# Patient Record
Sex: FEMALE | Race: WHITE | NOT HISPANIC OR LATINO | Employment: OTHER | URBAN - METROPOLITAN AREA
[De-identification: names, ages, dates, MRNs, and addresses within clinical notes are randomized per-mention and may not be internally consistent; named-entity substitution may affect disease eponyms.]

---

## 2018-10-31 ENCOUNTER — OFFICE VISIT (OUTPATIENT)
Dept: CARDIOLOGY | Facility: HOSPITAL | Age: 68
End: 2018-10-31

## 2018-10-31 ENCOUNTER — HOSPITAL ENCOUNTER (OUTPATIENT)
Dept: CARDIOLOGY | Facility: HOSPITAL | Age: 68
Discharge: HOME | End: 2018-10-31
Admitting: NURSE PRACTITIONER

## 2018-10-31 VITALS
SYSTOLIC BLOOD PRESSURE: 126 MMHG | TEMPERATURE: 98.2 F | HEIGHT: 65 IN | OXYGEN SATURATION: 94 % | DIASTOLIC BLOOD PRESSURE: 61 MMHG | HEART RATE: 142 BPM | WEIGHT: 213 LBS | BODY MASS INDEX: 35.49 KG/M2 | RESPIRATION RATE: 20 BRPM

## 2018-10-31 DIAGNOSIS — I10 ESSENTIAL HYPERTENSION: ICD-10-CM

## 2018-10-31 DIAGNOSIS — I48.19 PERSISTENT ATRIAL FIBRILLATION (HCC): Primary | ICD-10-CM

## 2018-10-31 DIAGNOSIS — R06.09 DYSPNEA ON EXERTION: ICD-10-CM

## 2018-10-31 DIAGNOSIS — I48.19 PERSISTENT ATRIAL FIBRILLATION (HCC): ICD-10-CM

## 2018-10-31 DIAGNOSIS — I50.9 HEART FAILURE, UNSPECIFIED HF CHRONICITY, UNSPECIFIED HEART FAILURE TYPE (HCC): ICD-10-CM

## 2018-10-31 PROCEDURE — 93005 ELECTROCARDIOGRAM TRACING: CPT | Performed by: NURSE PRACTITIONER

## 2018-10-31 PROCEDURE — 93010 ELECTROCARDIOGRAM REPORT: CPT | Performed by: INTERNAL MEDICINE

## 2018-10-31 PROCEDURE — 99204 OFFICE O/P NEW MOD 45 MIN: CPT | Performed by: NURSE PRACTITIONER

## 2018-10-31 RX ORDER — LEVOTHYROXINE SODIUM 0.05 MG/1
50 TABLET ORAL DAILY
COMMUNITY
End: 2018-11-27

## 2018-10-31 RX ORDER — DILTIAZEM HYDROCHLORIDE 120 MG/1
120 CAPSULE, COATED, EXTENDED RELEASE ORAL DAILY
Qty: 30 CAPSULE | Refills: 3 | Status: SHIPPED | OUTPATIENT
Start: 2018-10-31 | End: 2018-10-31 | Stop reason: SDUPTHER

## 2018-10-31 RX ORDER — OMEGA-3S/DHA/EPA/FISH OIL/D3 300MG-1000
400 CAPSULE ORAL DAILY
COMMUNITY
End: 2018-11-27

## 2018-10-31 RX ORDER — CITALOPRAM 20 MG/1
TABLET ORAL DAILY
COMMUNITY

## 2018-10-31 RX ORDER — LANOLIN ALCOHOL/MO/W.PET/CERES
1000 CREAM (GRAM) TOPICAL DAILY
COMMUNITY
End: 2018-11-27

## 2018-10-31 RX ORDER — ANASTROZOLE 1 MG/1
1 TABLET ORAL DAILY
COMMUNITY

## 2018-10-31 RX ORDER — PHENOL 1.4 %
600 AEROSOL, SPRAY (ML) MUCOUS MEMBRANE DAILY
COMMUNITY

## 2018-10-31 RX ORDER — DILTIAZEM HYDROCHLORIDE 120 MG/1
120 CAPSULE, EXTENDED RELEASE ORAL DAILY
Qty: 30 CAPSULE | Refills: 0 | Status: ON HOLD | OUTPATIENT
Start: 2018-10-31 | End: 2018-12-03

## 2018-10-31 RX ORDER — METOPROLOL TARTRATE 50 MG/1
50 TABLET, FILM COATED ORAL 2 TIMES DAILY
COMMUNITY
End: 2019-01-29 | Stop reason: SDUPTHER

## 2018-10-31 RX ORDER — MULTIPLE VITAMINS W/ MINERALS TAB 9MG-400MCG
1 TAB ORAL DAILY
COMMUNITY

## 2018-11-01 PROBLEM — I48.91 A-FIB (HCC): Status: ACTIVE | Noted: 2018-11-01

## 2018-11-01 PROBLEM — I50.9 HEART FAILURE (HCC): Status: ACTIVE | Noted: 2018-11-01

## 2018-11-01 PROBLEM — I10 HYPERTENSION: Status: ACTIVE | Noted: 2018-11-01

## 2018-11-02 ENCOUNTER — TELEPHONE (OUTPATIENT)
Dept: CARDIOLOGY | Facility: HOSPITAL | Age: 68
End: 2018-11-02

## 2018-11-02 ENCOUNTER — DOCUMENTATION (OUTPATIENT)
Dept: CARDIOLOGY | Facility: HOSPITAL | Age: 68
End: 2018-11-02

## 2018-11-02 DIAGNOSIS — E78.01 FAMILIAL HYPERCHOLESTEROLEMIA: Primary | ICD-10-CM

## 2018-11-02 DIAGNOSIS — I48.19 PERSISTENT ATRIAL FIBRILLATION (HCC): ICD-10-CM

## 2018-11-02 DIAGNOSIS — G47.33 OSA (OBSTRUCTIVE SLEEP APNEA): Primary | ICD-10-CM

## 2018-11-02 NOTE — PROGRESS NOTES
See medical records and reviewed echocardiogram completed 5/14/18 with normal EF    Sleep study completed January 2017 with moderate obstructive sleep apnea.  Patient is not wearing CPAP.  We'll discuss repeat sleep study or referral to [facility] sleep [place] for management of sleep apnea    Reviewed recent lab results    10/3/18: TSH 1.89    8/28/18:   WBC 7.7, hemoglobin 12.1, hematocrit 37.5, platelet 226    Sodium 146, potassium 4.2, chloride 102, carbon dioxide 32, glucose 124, BUN 12, creatinine 0.9, AST 16, ALT 9, estimated GFR 66    04/12/18:  Cholesterol 235, triglycerides 159, HDL 51,

## 2018-11-02 NOTE — TELEPHONE ENCOUNTER
----- Message from AMOR Russo sent at 11/2/2018 11:03 AM EDT -----  I have received pts echo from [facility].    Sleep study results and labs    Call pt let her know that sleep study showed moderate RELL.  With Afib she will need management of RELL.  I would like to put in a referral to our sleep center to see if a repeat sleep study is needed or if they could being treatment.  Would pt be ok with this?    Also I received her labs showing elevated cholesterol levels in 4/2018.  ON day of her stress test have her get fasting labs to recheck levels.  If still elevated. She will need medication for treatment.    How is her Blood pressure and HR after starting cardizem?

## 2018-11-02 NOTE — TELEPHONE ENCOUNTER
----- Message from AMOR Russo sent at 11/2/2018 11:03 AM EDT -----  I have received pts echo from [facility].    Sleep study results and labs    Call pt let her know that sleep study showed moderate RELL.  With Afib she will need management of RELL.  I would like to put in a referral to our sleep center to see if a repeat sleep study is needed or if they could being treatment.  Would pt be ok with this?    Also I received her labs showing elevated cholesterol levels in 4/2018.  ON day of her stress test have her get fasting labs to recheck levels.  If still elevated. She will need medication for treatment.    How is her Blood pressure and HR after starting cardizem?

## 2018-11-02 NOTE — TELEPHONE ENCOUNTER
Called pt and gave the above message. Pt is ok with the referral to the sleep center. Pt going to get fasting labs on the day of her stress test. Pt reported that her HR is the same after starting the Cardizem and reported she did not get her BP machine yet. Told her to call our office with BP reading when she gets one.

## 2018-11-05 ENCOUNTER — TELEPHONE (OUTPATIENT)
Dept: CARDIOLOGY | Facility: HOSPITAL | Age: 68
End: 2018-11-05

## 2018-11-05 NOTE — TELEPHONE ENCOUNTER
Called pt and told her not to stop metoprolol or the cardizem before her stress test. Pt verbalized understanding.

## 2018-11-05 NOTE — TELEPHONE ENCOUNTER
----- Message from AMOR Russo sent at 11/5/2018 12:54 PM EST -----  Regarding: RE: Bp readings  Contact: [phone]  I do not want her to stop either med before her stress test because of the afib.   ----- Message -----  From: Mark Knutson MA  Sent: 11/5/2018  12:32 PM  To: AMOR Russo  Subject: Bp readings                                      Pt called and reported the following BP and HR readings.  11/4/18 @ 9.30 am  134/89   81  11/4/18 @ 6.00 pm  135/76   90  11/5/18 @ 6.00 am  133/78   77    Pt also asked if she has to be off of both metoprolol and cardizem 24 hours prior to her stress test.

## 2018-11-16 ENCOUNTER — HOSPITAL ENCOUNTER (OUTPATIENT)
Dept: CARDIOLOGY | Facility: HOSPITAL | Age: 68
Discharge: HOME | End: 2018-11-16

## 2018-11-16 VITALS
HEIGHT: 65 IN | WEIGHT: 212.96 LBS | DIASTOLIC BLOOD PRESSURE: 80 MMHG | SYSTOLIC BLOOD PRESSURE: 120 MMHG | BODY MASS INDEX: 35.48 KG/M2

## 2018-11-16 DIAGNOSIS — R06.09 DYSPNEA ON EXERTION: ICD-10-CM

## 2018-11-16 DIAGNOSIS — I48.19 PERSISTENT ATRIAL FIBRILLATION (HCC): ICD-10-CM

## 2018-11-16 LAB
BH CV STRESS BP STAGE 2: NORMAL
BH CV STRESS BP STAGE 4: NORMAL
BH CV STRESS COMMENTS STAGE 1: NORMAL
BH CV STRESS DOSE REGADENOSON STAGE 1: 0.4
BH CV STRESS DURATION MIN STAGE 1: 1
BH CV STRESS DURATION MIN STAGE 2: 1
BH CV STRESS DURATION MIN STAGE 3: 1
BH CV STRESS DURATION MIN STAGE 4: 1
BH CV STRESS DURATION SEC STAGE 1: 0
BH CV STRESS DURATION SEC STAGE 2: 0
BH CV STRESS HR STAGE 1: 86
BH CV STRESS HR STAGE 2: 93
BH CV STRESS HR STAGE 3: 85
BH CV STRESS HR STAGE 4: 95
BH CV STRESS PROTOCOL 1: NORMAL
BH CV STRESS RECOVERY BP: NORMAL MMHG
BH CV STRESS RECOVERY HR: 86 BPM
BH CV STRESS STAGE 1: 1
BH CV STRESS STAGE 2: 2
BH CV STRESS STAGE 3: 3
BH CV STRESS STAGE 4: 4
LV EF NUC BP: 70 %
MAXIMAL PREDICTED HEART RATE: 152 BPM
PERCENT MAX PREDICTED HR: 65.79 %
STRESS BASELINE BP: NORMAL MMHG
STRESS BASELINE HR: 87 BPM
STRESS PERCENT HR: 77 %
STRESS POST PEAK BP: NORMAL MMHG
STRESS POST PEAK HR: 100 BPM
STRESS TARGET HR: 129 BPM

## 2018-11-16 PROCEDURE — 25010000002 REGADENOSON 0.4 MG/5ML SOLUTION: Performed by: NURSE PRACTITIONER

## 2018-11-16 PROCEDURE — 78452 HT MUSCLE IMAGE SPECT MULT: CPT | Performed by: INTERNAL MEDICINE

## 2018-11-16 PROCEDURE — A9500 TC99M SESTAMIBI: HCPCS | Performed by: NURSE PRACTITIONER

## 2018-11-16 PROCEDURE — 0 TECHNETIUM SESTAMIBI: Performed by: NURSE PRACTITIONER

## 2018-11-16 PROCEDURE — 78452 HT MUSCLE IMAGE SPECT MULT: CPT

## 2018-11-16 PROCEDURE — 93017 CV STRESS TEST TRACING ONLY: CPT

## 2018-11-16 PROCEDURE — 93018 CV STRESS TEST I&R ONLY: CPT | Performed by: INTERNAL MEDICINE

## 2018-11-16 RX ADMIN — TECHNETIUM TC 99M SESTAMIBI 1 DOSE: 1 INJECTION INTRAVENOUS at 12:55

## 2018-11-16 RX ADMIN — REGADENOSON 0.4 MG: 0.08 INJECTION, SOLUTION INTRAVENOUS at 12:53

## 2018-11-16 RX ADMIN — TECHNETIUM TC 99M SESTAMIBI 1 DOSE: 1 INJECTION INTRAVENOUS at 11:20

## 2018-11-19 ENCOUNTER — TELEPHONE (OUTPATIENT)
Dept: CARDIOLOGY | Facility: HOSPITAL | Age: 68
End: 2018-11-19

## 2018-11-19 NOTE — TELEPHONE ENCOUNTER
----- Message from AMOR Russo sent at 11/19/2018  7:31 AM EST -----  Stress test reviewed. Normal heart muscle function.  NO evidence of a heart blockage.

## 2018-11-27 ENCOUNTER — PREP FOR SURGERY (OUTPATIENT)
Dept: OTHER | Facility: HOSPITAL | Age: 68
End: 2018-11-27

## 2018-11-27 ENCOUNTER — CONSULT (OUTPATIENT)
Dept: CARDIOLOGY | Facility: CLINIC | Age: 68
End: 2018-11-27

## 2018-11-27 VITALS
SYSTOLIC BLOOD PRESSURE: 128 MMHG | DIASTOLIC BLOOD PRESSURE: 86 MMHG | BODY MASS INDEX: 36.37 KG/M2 | HEIGHT: 64 IN | WEIGHT: 213 LBS | HEART RATE: 89 BPM

## 2018-11-27 DIAGNOSIS — I48.19 PERSISTENT ATRIAL FIBRILLATION (HCC): Primary | ICD-10-CM

## 2018-11-27 PROCEDURE — 93000 ELECTROCARDIOGRAM COMPLETE: CPT | Performed by: PHYSICIAN ASSISTANT

## 2018-11-27 PROCEDURE — 99203 OFFICE O/P NEW LOW 30 MIN: CPT | Performed by: PHYSICIAN ASSISTANT

## 2018-11-27 RX ORDER — SODIUM CHLORIDE 0.9 % (FLUSH) 0.9 %
3 SYRINGE (ML) INJECTION EVERY 12 HOURS SCHEDULED
Status: CANCELLED | OUTPATIENT
Start: 2018-11-27

## 2018-11-27 RX ORDER — PROPAFENONE HYDROCHLORIDE 150 MG/1
150 TABLET, COATED ORAL EVERY 8 HOURS
Qty: 90 TABLET | Refills: 5 | Status: SHIPPED | OUTPATIENT
Start: 2018-11-27 | End: 2019-01-29

## 2018-11-27 RX ORDER — ACETAMINOPHEN 325 MG/1
650 TABLET ORAL EVERY 4 HOURS PRN
Status: CANCELLED | OUTPATIENT
Start: 2018-11-27

## 2018-11-27 RX ORDER — POTASSIUM CHLORIDE 20 MEQ/1
20 TABLET, EXTENDED RELEASE ORAL AS NEEDED
COMMUNITY

## 2018-11-27 RX ORDER — FUROSEMIDE 20 MG/1
20 TABLET ORAL AS NEEDED
COMMUNITY

## 2018-11-27 RX ORDER — ONDANSETRON 2 MG/ML
4 INJECTION INTRAMUSCULAR; INTRAVENOUS EVERY 6 HOURS PRN
Status: CANCELLED | OUTPATIENT
Start: 2018-11-27

## 2018-11-27 RX ORDER — SODIUM CHLORIDE 0.9 % (FLUSH) 0.9 %
3-10 SYRINGE (ML) INJECTION AS NEEDED
Status: CANCELLED | OUTPATIENT
Start: 2018-11-27

## 2018-11-27 NOTE — PROGRESS NOTES
Renetta Reilly  1950  PCP: Tee Salmeron DO    SUBJECTIVE:   Renetta Reilly is a 68 y.o. female seen for a consultation visit regarding the following:     Chief Complaint:   Chief Complaint   Patient presents with   • Atrial Fibrillation   • Irregular Heart Beat   • Shortness of Breath          Consultation is requested by AMOR Russo for evaluation of Atrial Fibrillation; Irregular Heart Beat; and Shortness of Breath        History:  Location***, quality***, timing***, duration***, modifying***      Cardiac PMH: (Old records have been reviewed and summarized below)      Past Medical History, Past Surgical History, Family history, Social History, and Medications were all reviewed with the patient today and updated as necessary.       Current Outpatient Medications:   •  anastrozole (ARIMIDEX) 1 MG tablet, Take 1 mg by mouth Daily., Disp: , Rfl:   •  apixaban (ELIQUIS) 5 MG tablet tablet, Take 5 mg by mouth 2 (Two) Times a Day., Disp: , Rfl:   •  calcium carbonate (OS-KENROY) 600 MG tablet, Take 600 mg by mouth Daily., Disp: , Rfl:   •  citalopram (CeleXA) 20 MG tablet, Take  by mouth Daily., Disp: , Rfl:   •  diltiazem XR (DILACOR XR) 120 MG 24 hr capsule, Take 1 capsule by mouth Daily., Disp: 30 capsule, Rfl: 0  •  Fluticasone Furoate-Vilanterol (BREO ELLIPTA) 100-25 MCG/INH aerosol powder , Inhale 1 puff Daily., Disp: , Rfl:   •  furosemide (LASIX) 20 MG tablet, Take 20 mg by mouth As Needed., Disp: , Rfl:   •  metoprolol tartrate (LOPRESSOR) 50 MG tablet, Take 50 mg by mouth 2 (Two) Times a Day., Disp: , Rfl:   •  Multiple Vitamins-Minerals (MULTIVITAMIN WITH MINERALS) tablet tablet, Take 1 tablet by mouth Daily., Disp: , Rfl:   •  potassium chloride (K-DUR,KLOR-CON) 20 MEQ CR tablet, Take 20 mEq by mouth As Needed., Disp: , Rfl:     Allergies   Allergen Reactions   • Amiodarone Arrhythmia     States got deathly ill. States had congestive heart failure.          Past Medical History:   Diagnosis Date   •  "A-fib (CMS/[facility])    • Anxiety    • Breast CA (CMS/HCC)    • COPD (chronic obstructive pulmonary disease) (CMS/HCC)    • Depressed    • Heart failure (CMS/HCC)    • Heart failure (CMS/HCC)    • Hypertension    • Hypothyroidism    • Obesity    • Thyroid disease      Past Surgical History:   Procedure Laterality Date   • BREAST SURGERY Right    • FOOT SURGERY Right    • HAND SURGERY Left      Family History   Problem Relation Age of Onset   • Stroke Mother    • Stroke Father    • Colon cancer Sister      Social History     Tobacco Use   • Smoking status: Former Smoker     Years: 20.00     Types: Cigarettes     Last attempt to quit: 2000     Years since quittin.9   • Smokeless tobacco: Never Used   Substance Use Topics   • Alcohol use: No       ROS:  ***       PHYSICAL EXAM:   /86 (BP Location: Left arm, Patient Position: Sitting)   Pulse 89   Ht 162.6 cm (64\")   Wt 96.6 kg (213 lb)   BMI 36.56 kg/m²      Wt Readings from Last 5 Encounters:   18 96.6 kg (213 lb)   18 96.6 kg (212 lb 15.4 oz)   10/31/18 96.6 kg (213 lb)     BP Readings from Last 5 Encounters:   18 128/86   18 120/80   10/31/18 126/61       General-Well Nourished, Well developed  Eyes - PERRLA  Neck- supple, No mass  CV- regular rate and rhythm, no MRG  Lung- clear bilaterally  Abd- soft, +BS  Musc/skel - Norm strength and range of motion  Skin- warm and dry  Neuro - Alert & Oriented x 3, appropriate mood.    Medical problems and test results were reviewed with the patient today.     Results for orders placed or performed during the hospital encounter of 18   Stress Test With Myocardial Perfusion (1 Day)   Result Value Ref Range    Target HR (85%) 129 bpm    Max. Pred. HR (100%) 152 bpm    BH CV STRESS PROTOCOL 1 Pharmacologic     Stage 1 1     HR Stage 1 86     Duration Min Stage 1 1     Duration Sec Stage 1 0     Stress Dose Regadenoson Stage 1 0.4     Stress Comments Stage 1 10 sec bolus injection     Stage 2 " 2     HR Stage 2 93     BP Stage 2 130/90     Duration Min Stage 2 1     Duration Sec Stage 2 0     Stage 3 3     HR Stage 3 85     Duration Min Stage 3 1     Stage 4 4     HR Stage 4 95     BP Stage 4 130/80     Duration Min Stage 4 1     Baseline HR 87 bpm    Baseline /80 mmHg    Recovery /80 mmHg    Peak  bpm    Percent Max Pred HR 65.79 %    Percent Target HR 77 %    Peak /80 mmHg    Recovery HR 86 bpm    Nuc Stress EF 70 %         No results found for: CHOL, HDL, HDLC, LDL, LDLC, VLDL    EKG:  (EKG/Tracing has been independently visualized by me and summarized below)    Procedures    ASSESSMENT and PLAN      No Follow-up on file.            Luke Lira M.D., F.A.C.C, F.H.R.S.  Cardiology/Electrophysiology  11/27/18  1:58 PM

## 2018-11-27 NOTE — PROGRESS NOTES
"Renetta Reilly  1950  PCP: Tee Salmeron DO    SUBJECTIVE:   Renetta Reilly is a 68 y.o. female seen for a consultation visit regarding the following:     Chief Complaint:   Chief Complaint   Patient presents with   • Atrial Fibrillation   • Irregular Heart Beat   • Shortness of Breath          Consultation is requested by AMOR Russo for evaluation of Atrial Fibrillation; Irregular Heart Beat; and Shortness of Breath        History:    Patient is a 68 year old female with a history of atrial fibrillation (diagnosed in 2016), COPD, BC s/p mastectomy, and hypothyroidism that presents today in consultation for afib. She has reportedly been on Rythmol and and BB in past and maintained rhythm for 2 years. Her Rythmol was discontinued in June and she believes that is when she went out of rhythm. She is symptomatic with EID and palpitations. She was started on Tikosyn, but was discontinued because \"her heart went crazy and was beating 220 bpm\". She was also intolerant to amiodarone in the past. She feels SOB on a daily basis. Denies any chest pain or LE edema.       Cardiac PMH: (Old records have been reviewed and summarized below)    • A-fib (CMS/[facility]) 11/01/2018       Note Last Updated: 11/1/2018       · DX 2016  · Chadsvsc: 4 (HTN, female, age, HF)  · Echocardiogram 5/18/2018 ([facility]): EF 60-65%, diastolic dysfunction, LA 4.8 cm, mild MR, mild AR, mild TR, RVSP 35 mmHg  · History of Rythmol use with recurrent A. Fib  · Reported Tikosyn initiation, 10 2018, [facility] [place]: Unclear of why discontinued      • Heart failure (CMS/[facility]) 11/01/2018       Note Last Updated: 11/1/2018       · HF with preserved EF, setting of A. Fib with RVR      • Hypertension              Past Medical History, Past Surgical History, Family history, Social History, and Medications were all reviewed with the patient today and updated as necessary.       Current Outpatient Medications:   •  anastrozole (ARIMIDEX) 1 MG tablet, Take 1 mg " by mouth Daily., Disp: , Rfl:   •  apixaban (ELIQUIS) 5 MG tablet tablet, Take 5 mg by mouth 2 (Two) Times a Day., Disp: , Rfl:   •  calcium carbonate (OS-KENROY) 600 MG tablet, Take 600 mg by mouth Daily., Disp: , Rfl:   •  citalopram (CeleXA) 20 MG tablet, Take  by mouth Daily., Disp: , Rfl:   •  diltiazem XR (DILACOR XR) 120 MG 24 hr capsule, Take 1 capsule by mouth Daily., Disp: 30 capsule, Rfl: 0  •  Fluticasone Furoate-Vilanterol (BREO ELLIPTA) 100-25 MCG/INH aerosol powder , Inhale 1 puff Daily., Disp: , Rfl:   •  furosemide (LASIX) 20 MG tablet, Take 20 mg by mouth As Needed., Disp: , Rfl:   •  metoprolol tartrate (LOPRESSOR) 50 MG tablet, Take 50 mg by mouth 2 (Two) Times a Day., Disp: , Rfl:   •  Multiple Vitamins-Minerals (MULTIVITAMIN WITH MINERALS) tablet tablet, Take 1 tablet by mouth Daily., Disp: , Rfl:   •  potassium chloride (K-DUR,KLOR-CON) 20 MEQ CR tablet, Take 20 mEq by mouth As Needed., Disp: , Rfl:     Allergies   Allergen Reactions   • Amiodarone Arrhythmia     States got deathly ill. States had congestive heart failure.          Past Medical History:   Diagnosis Date   • A-fib (CMS/HCC)    • Anxiety    • Breast CA (CMS/HCC)    • COPD (chronic obstructive pulmonary disease) (CMS/HCC)    • Depressed    • Heart failure (CMS/HCC)    • Heart failure (CMS/HCC)    • Hypertension    • Hypothyroidism    • Obesity    • Thyroid disease      Past Surgical History:   Procedure Laterality Date   • BREAST SURGERY Right    • FOOT SURGERY Right    • HAND SURGERY Left      Family History   Problem Relation Age of Onset   • Stroke Mother    • Stroke Father    • Colon cancer Sister      Social History     Tobacco Use   • Smoking status: Former Smoker     Years: 20.00     Types: Cigarettes     Last attempt to quit: 2000     Years since quittin.9   • Smokeless tobacco: Never Used   Substance Use Topics   • Alcohol use: No       ROS:  Review of Systems:  General: no recent weight loss/gain, weakness or  "fatigue  Skin: no rashes, lumps, or other skin changes  HEENT: no dizziness, lightheadedness, or vision changes  Respiratory: no cough or hemoptysis  Cardiovascular: + palpitations, and tachycardia  Gastrointestinal: no black/tarry stools or diarrhea  Urinary: no change in frequency or urgency  Peripheral Vascular: no claudication or leg cramps  Musculoskeletal: no muscle or joint pain/stiffness  Psychiatric: no depression or excessive stress  Neurological: no sensory or motor loss, no syncope  Hematologic: no anemia, easy bruising or bleeding  Endocrine: no thyroid problems, nor heat or cold intolerance       PHYSICAL EXAM:   /86 (BP Location: Left arm, Patient Position: Sitting)   Pulse 89   Ht 162.6 cm (64\")   Wt 96.6 kg (213 lb)   BMI 36.56 kg/m²      Wt Readings from Last 5 Encounters:   11/27/18 96.6 kg (213 lb)   11/16/18 96.6 kg (212 lb 15.4 oz)   10/31/18 96.6 kg (213 lb)     BP Readings from Last 5 Encounters:   11/27/18 128/86   11/16/18 120/80   10/31/18 126/61       General-Well Nourished, Well developed  Eyes - PERRLA  Neck- supple, No mass  CV- irregular rate and rhythm, no MRG  Lung- clear bilaterally  Abd- soft, +BS  Musc/skel - Norm strength and range of motion  Skin- warm and dry  Neuro - Alert & Oriented x 3, appropriate mood.    Medical problems and test results were reviewed with the patient today.     Results for orders placed or performed during the hospital encounter of 11/16/18   Stress Test With Myocardial Perfusion (1 Day)   Result Value Ref Range    Target HR (85%) 129 bpm    Max. Pred. HR (100%) 152 bpm    BH CV STRESS PROTOCOL 1 Pharmacologic     Stage 1 1     HR Stage 1 86     Duration Min Stage 1 1     Duration Sec Stage 1 0     Stress Dose Regadenoson Stage 1 0.4     Stress Comments Stage 1 10 sec bolus injection     Stage 2 2     HR Stage 2 93     BP Stage 2 130/90     Duration Min Stage 2 1     Duration Sec Stage 2 0     Stage 3 3     HR Stage 3 85     Duration Min Stage " 3 1     Stage 4 4     HR Stage 4 95     BP Stage 4 130/80     Duration Min Stage 4 1     Baseline HR 87 bpm    Baseline /80 mmHg    Recovery /80 mmHg    Peak  bpm    Percent Max Pred HR 65.79 %    Percent Target HR 77 %    Peak /80 mmHg    Recovery HR 86 bpm    Nuc Stress EF 70 %         No results found for: CHOL, HDL, HDLC, LDL, LDLC, VLDL    EKG:  (EKG/Tracing has been independently visualized by me and summarized below)      ECG 12 Lead  Date/Time: 11/27/2018 2:28 PM  Performed by: Nancy Reyes PA  Authorized by: Nancy Reyes PA   Rhythm: atrial fibrillation  Rate: normal  BPM: 89  Clinical impression: abnormal ECG and dysrhythmia - atrial            ASSESSMENT and PLAN    1. Persistent Atrial Fibrillation- previously maintained on Rythmol until discontinued. Unable to tolerate Amiodarone. Had reported prolonged QT and ? Ventricular arrhythmias with Tikosyn. Will restart Rythmol today and plan for ECV on Monday. Patient has been anticoagulated with Eliquis 5mg BID.     2. HTN- controlled.     No Follow-up on file.      LOULOU SaenzC  Cardiology/Electrophysiology  11/27/18  1:59 PM

## 2018-12-03 ENCOUNTER — HOSPITAL ENCOUNTER (OUTPATIENT)
Dept: CARDIOLOGY | Facility: HOSPITAL | Age: 68
Discharge: HOME | End: 2018-12-03
Attending: INTERNAL MEDICINE | Admitting: INTERNAL MEDICINE

## 2018-12-03 VITALS
HEART RATE: 64 BPM | HEIGHT: 64 IN | TEMPERATURE: 98 F | DIASTOLIC BLOOD PRESSURE: 76 MMHG | BODY MASS INDEX: 35.98 KG/M2 | OXYGEN SATURATION: 95 % | RESPIRATION RATE: 18 BRPM | SYSTOLIC BLOOD PRESSURE: 132 MMHG | WEIGHT: 210.76 LBS

## 2018-12-03 DIAGNOSIS — I48.19 PERSISTENT ATRIAL FIBRILLATION (HCC): ICD-10-CM

## 2018-12-03 LAB
ALBUMIN SERPL-MCNC: 4.57 G/DL (ref 3.2–4.8)
ALBUMIN/GLOB SERPL: 1.9 G/DL (ref 1.5–2.5)
ALP SERPL-CCNC: 70 U/L (ref 25–100)
ALT SERPL W P-5'-P-CCNC: 33 U/L (ref 7–40)
ANION GAP SERPL CALCULATED.3IONS-SCNC: 6 MMOL/L (ref 3–11)
AST SERPL-CCNC: 49 U/L (ref 0–33)
BILIRUB SERPL-MCNC: 0.5 MG/DL (ref 0.3–1.2)
BUN BLD-MCNC: 22 MG/DL (ref 9–23)
BUN/CREAT SERPL: 23.9 (ref 7–25)
CALCIUM SPEC-SCNC: 9.3 MG/DL (ref 8.7–10.4)
CHLORIDE SERPL-SCNC: 103 MMOL/L (ref 99–109)
CO2 SERPL-SCNC: 28 MMOL/L (ref 20–31)
CREAT BLD-MCNC: 0.92 MG/DL (ref 0.6–1.3)
DEPRECATED RDW RBC AUTO: 43.8 FL (ref 37–54)
ERYTHROCYTE [DISTWIDTH] IN BLOOD BY AUTOMATED COUNT: 13.4 % (ref 11.3–14.5)
GFR SERPL CREATININE-BSD FRML MDRD: 61 ML/MIN/1.73
GLOBULIN UR ELPH-MCNC: 2.4 GM/DL
GLUCOSE BLD-MCNC: 108 MG/DL (ref 70–100)
HCT VFR BLD AUTO: 42.8 % (ref 34.5–44)
HGB BLD-MCNC: 13.2 G/DL (ref 11.5–15.5)
INR PPP: 1.3 (ref 0.91–1.09)
MAGNESIUM SERPL-MCNC: 1.9 MG/DL (ref 1.3–2.7)
MCH RBC QN AUTO: 27.8 PG (ref 27–31)
MCHC RBC AUTO-ENTMCNC: 30.8 G/DL (ref 32–36)
MCV RBC AUTO: 90.1 FL (ref 80–99)
PLATELET # BLD AUTO: 244 10*3/MM3 (ref 150–450)
PMV BLD AUTO: 11.3 FL (ref 6–12)
POTASSIUM BLD-SCNC: 4.2 MMOL/L (ref 3.5–5.5)
PROT SERPL-MCNC: 7 G/DL (ref 5.7–8.2)
PROTHROMBIN TIME: 13.6 SECONDS (ref 9.6–11.5)
RBC # BLD AUTO: 4.75 10*6/MM3 (ref 3.89–5.14)
SODIUM BLD-SCNC: 137 MMOL/L (ref 132–146)
WBC NRBC COR # BLD: 7.42 10*3/MM3 (ref 3.5–10.8)

## 2018-12-03 PROCEDURE — 92960 CARDIOVERSION ELECTRIC EXT: CPT | Performed by: INTERNAL MEDICINE

## 2018-12-03 PROCEDURE — S0260 H&P FOR SURGERY: HCPCS | Performed by: INTERNAL MEDICINE

## 2018-12-03 PROCEDURE — 92960 CARDIOVERSION ELECTRIC EXT: CPT

## 2018-12-03 PROCEDURE — 80053 COMPREHEN METABOLIC PANEL: CPT | Performed by: INTERNAL MEDICINE

## 2018-12-03 PROCEDURE — 93005 ELECTROCARDIOGRAM TRACING: CPT | Performed by: INTERNAL MEDICINE

## 2018-12-03 PROCEDURE — 85610 PROTHROMBIN TIME: CPT | Performed by: INTERNAL MEDICINE

## 2018-12-03 PROCEDURE — 36415 COLL VENOUS BLD VENIPUNCTURE: CPT

## 2018-12-03 PROCEDURE — 93010 ELECTROCARDIOGRAM REPORT: CPT | Performed by: INTERNAL MEDICINE

## 2018-12-03 PROCEDURE — 85027 COMPLETE CBC AUTOMATED: CPT | Performed by: INTERNAL MEDICINE

## 2018-12-03 PROCEDURE — 83735 ASSAY OF MAGNESIUM: CPT | Performed by: INTERNAL MEDICINE

## 2018-12-03 PROCEDURE — 25010000002 MIDAZOLAM PER 1 MG: Performed by: INTERNAL MEDICINE

## 2018-12-03 RX ORDER — NALOXONE HYDROCHLORIDE 0.4 MG/ML
INJECTION, SOLUTION INTRAMUSCULAR; INTRAVENOUS; SUBCUTANEOUS
Status: DISCONTINUED
Start: 2018-12-03 | End: 2018-12-03 | Stop reason: WASHOUT

## 2018-12-03 RX ORDER — SODIUM CHLORIDE 0.9 % (FLUSH) 0.9 %
3 SYRINGE (ML) INJECTION EVERY 12 HOURS SCHEDULED
Status: DISCONTINUED | OUTPATIENT
Start: 2018-12-03 | End: 2018-12-03 | Stop reason: HOSPADM

## 2018-12-03 RX ORDER — MIDAZOLAM HYDROCHLORIDE 1 MG/ML
INJECTION INTRAMUSCULAR; INTRAVENOUS
Status: COMPLETED | OUTPATIENT
Start: 2018-12-03 | End: 2018-12-03

## 2018-12-03 RX ORDER — SODIUM CHLORIDE 0.9 % (FLUSH) 0.9 %
3-10 SYRINGE (ML) INJECTION AS NEEDED
Status: DISCONTINUED | OUTPATIENT
Start: 2018-12-03 | End: 2018-12-03 | Stop reason: HOSPADM

## 2018-12-03 RX ORDER — FLUMAZENIL 0.1 MG/ML
INJECTION INTRAVENOUS
Status: DISCONTINUED
Start: 2018-12-03 | End: 2018-12-03 | Stop reason: WASHOUT

## 2018-12-03 RX ORDER — ACETAMINOPHEN 325 MG/1
650 TABLET ORAL EVERY 4 HOURS PRN
Status: DISCONTINUED | OUTPATIENT
Start: 2018-12-03 | End: 2018-12-03 | Stop reason: HOSPADM

## 2018-12-03 RX ORDER — MIDAZOLAM HYDROCHLORIDE 1 MG/ML
INJECTION INTRAMUSCULAR; INTRAVENOUS
Status: DISCONTINUED
Start: 2018-12-03 | End: 2018-12-03 | Stop reason: HOSPADM

## 2018-12-03 RX ORDER — LEVOTHYROXINE SODIUM 0.05 MG/1
50 TABLET ORAL DAILY
COMMUNITY

## 2018-12-03 RX ORDER — ONDANSETRON 2 MG/ML
4 INJECTION INTRAMUSCULAR; INTRAVENOUS EVERY 6 HOURS PRN
Status: DISCONTINUED | OUTPATIENT
Start: 2018-12-03 | End: 2018-12-03 | Stop reason: HOSPADM

## 2018-12-03 RX ORDER — PANTOPRAZOLE SODIUM 40 MG/1
40 TABLET, DELAYED RELEASE ORAL DAILY
COMMUNITY
End: 2019-01-29

## 2018-12-03 RX ADMIN — METHOHEXITAL SODIUM 20 MG: 500 INJECTION, POWDER, LYOPHILIZED, FOR SOLUTION INTRAMUSCULAR; INTRAVENOUS; RECTAL at 11:54

## 2018-12-03 RX ADMIN — MIDAZOLAM HYDROCHLORIDE 2 MG: 1 INJECTION, SOLUTION INTRAMUSCULAR; INTRAVENOUS at 11:54

## 2018-12-03 NOTE — H&P
"  Chief Complaint: afib       Subjective:     Patient is a 68 y.o. female who presents with atrial fibrillation for ECV. She was seen in the office last week and has not had any changes since that time.     History:     Patient is a 68 year old female with a history of atrial fibrillation (diagnosed in 2016), COPD, BC s/p mastectomy, and hypothyroidism that presents today in consultation for afib. She has reportedly been on Rythmol and and BB in past and maintained rhythm for 2 years. Her Rythmol was discontinued in June and she believes that is when she went out of rhythm. She is symptomatic with EID and palpitations. She was started on Tikosyn, but was discontinued because \"her heart went crazy and was beating 220 bpm\". She was also intolerant to amiodarone in the past. She feels SOB on a daily basis. Denies any chest pain or LE edema.         Cardiac PMH: (Old records have been reviewed and summarized below)           • A-fib (CMS/[facility]) 11/01/2018       Note Last Updated: 11/1/2018       · DX 2016  · Chadsvsc: 4 (HTN, female, age, HF)  · Echocardiogram 5/18/2018 ([facility]): EF 60-65%, diastolic dysfunction, LA 4.8 cm, mild MR, mild AR, mild TR, RVSP 35 mmHg  · History of Rythmol use with recurrent A. Fib  · Reported Tikosyn initiation, 10 2018, [facility] [place]: Unclear of why discontinued      • Heart failure (CMS/[facility]) 11/01/2018       Note Last Updated: 11/1/2018       · HF with preserved EF, setting of A. Fib with RVR      • Hypertension                  Past Medical History:   Diagnosis Date   • A-fib (CMS/HCC)    • Anxiety    • Breast CA (CMS/HCC)    • COPD (chronic obstructive pulmonary disease) (CMS/HCC)    • Depressed    • Heart failure (CMS/HCC)    • Heart failure (CMS/HCC)    • Hypertension    • Hypothyroidism    • Obesity    • Thyroid disease       Past Surgical History:   Procedure Laterality Date   • BREAST SURGERY Right    • FOOT SURGERY Right    • HAND SURGERY Left       Allergies   Allergen Reactions "   • Amiodarone Arrhythmia     States got deathly ill. States had congestive heart failure.      Social History     Tobacco Use   • Smoking status: Former Smoker     Years: 20.00     Types: Cigarettes     Last attempt to quit: 2000     Years since quittin.9   • Smokeless tobacco: Never Used   Substance Use Topics   • Alcohol use: No      FH:   Family History   Problem Relation Age of Onset   • Stroke Mother    • Stroke Father    • Colon cancer Sister           Current Facility-Administered Medications:   •  acetaminophen (TYLENOL) tablet 650 mg, 650 mg, Oral, Q4H PRN, Nancy Reyes PA  •  ondansetron (ZOFRAN) injection 4 mg, 4 mg, Intravenous, Q6H PRN, Nancy Reyes PA  •  sodium chloride 0.9 % flush 3 mL, 3 mL, Intravenous, Q12H, Nancy Reyes PA  •  sodium chloride 0.9 % flush 3-10 mL, 3-10 mL, Intravenous, PRN, Nancy Reyes PA    Review of Systems  Review of Systems:  General: no recent weight loss/gain, weakness or fatigue  Skin: no rashes, lumps, or other skin changes  HEENT: no dizziness, lightheadedness, or vision changes  Respiratory: no cough or hemoptysis  Cardiovascular: + palpitations, and tachycardia  Gastrointestinal: no black/tarry stools or diarrhea  Urinary: no change in frequency or urgency  Peripheral Vascular: no claudication or leg cramps  Musculoskeletal: no muscle or joint pain/stiffness  Psychiatric: no depression or excessive stress  Neurological: no sensory or motor loss, no syncope  Hematologic: no anemia, easy bruising or bleeding  Endocrine: no thyroid problems, nor heat or cold intolerance        Objective:       There were no vitals taken for this visit.    No intake/output data recorded.  No intake/output data recorded.    Physical Exam:  General-Well Nourished, Well developed  Eyes - PERRLA  Neck- supple, No mass  CV- irregular rate and rhythm, no MRG  Lung- clear bilaterally  Abd- soft, +BS  Musc/skel - Norm strength and range of motion  Skin- warm and dry  Neuro -  Alert & Oriented x 3, appropriate mood.      ECG: (I have reviewed the EKG/Tracing from today and listed the findings below) unchanged from previous tracings     Data Review:     No results found for this or any previous visit (from the past 24 hour(s)).        Assessment:     A-fib (CMS/[facility])         Plan:   1. Persistent Atrial Fibrillation   - restarted on Rythmol last week and presents for external cardioversion today.   - Unable to tolerate amiodarone and had reported QT prolongation with Tikosyn.  - long run can consider pulmonary vein ablation if failure of Rythmol.   - Continue Eliquis 5mg BID.     Electronically signed by ENA Reyez, 12/03/18, 10:39 AM.

## 2018-12-03 NOTE — PROCEDURES
PRE-ELECTROPHYSIOLOGY STUDY DIAGNOSIS: Atrial fibrillation.    PROCEDURE PERFORMED: External electrical cardioversion.    Anesthesia: Sedation with:  1. 2 mg Versed  2. 20 mg Brevital    Estimated Blood Loss: Less than 10 mL     Specimens: None    PROCEDURE IN DETAIL: The patient was brought into the [facility] in a fasting  state. The patient was given moderate sedation during which he received 200  joules of external electrical cardioversion that converted atrial  fibrillation to normal sinus heart rhythm.    IMPRESSION: Successful conversion of atrial fibrillation to normal sinus  heart rhythm.

## 2018-12-04 ENCOUNTER — TELEPHONE (OUTPATIENT)
Dept: CARDIOLOGY | Facility: CLINIC | Age: 68
End: 2018-12-04

## 2018-12-04 NOTE — TELEPHONE ENCOUNTER
Patient had an ECV yesterday. She is back out of rhythm today. Her BP is 125/66 and HR is between . Please advise.

## 2018-12-06 ENCOUNTER — TELEPHONE (OUTPATIENT)
Dept: CARDIOLOGY | Facility: CLINIC | Age: 68
End: 2018-12-06

## 2019-01-29 ENCOUNTER — OFFICE VISIT (OUTPATIENT)
Dept: CARDIOLOGY | Facility: CLINIC | Age: 69
End: 2019-01-29

## 2019-01-29 VITALS
SYSTOLIC BLOOD PRESSURE: 160 MMHG | HEART RATE: 90 BPM | DIASTOLIC BLOOD PRESSURE: 90 MMHG | HEIGHT: 65 IN | BODY MASS INDEX: 35.22 KG/M2 | WEIGHT: 211.4 LBS

## 2019-01-29 DIAGNOSIS — I10 ESSENTIAL HYPERTENSION: ICD-10-CM

## 2019-01-29 DIAGNOSIS — I48.21 PERMANENT ATRIAL FIBRILLATION (HCC): Primary | ICD-10-CM

## 2019-01-29 PROCEDURE — 99214 OFFICE O/P EST MOD 30 MIN: CPT | Performed by: PHYSICIAN ASSISTANT

## 2019-01-29 PROCEDURE — 93000 ELECTROCARDIOGRAM COMPLETE: CPT | Performed by: PHYSICIAN ASSISTANT

## 2019-01-29 RX ORDER — METOPROLOL TARTRATE 50 MG/1
50 TABLET, FILM COATED ORAL 2 TIMES DAILY
Qty: 60 TABLET | Refills: 6 | Status: SHIPPED | OUTPATIENT
Start: 2019-01-29 | End: 2019-05-28 | Stop reason: SDUPTHER

## 2019-01-29 RX ORDER — DILTIAZEM HYDROCHLORIDE 120 MG/1
120 CAPSULE, COATED, EXTENDED RELEASE ORAL DAILY
COMMUNITY
End: 2019-01-29 | Stop reason: SDUPTHER

## 2019-01-29 RX ORDER — DILTIAZEM HYDROCHLORIDE 120 MG/1
120 CAPSULE, COATED, EXTENDED RELEASE ORAL DAILY
Qty: 30 CAPSULE | Refills: 6 | Status: SHIPPED | OUTPATIENT
Start: 2019-01-29 | End: 2019-05-28 | Stop reason: SDUPTHER

## 2019-01-29 NOTE — PROGRESS NOTES
"    Renetta Reilly  1950  PCP: Tee Salmeron,     SUBJECTIVE:   Renetta Reilly is a 68 y.o. female seen for a follow up visit regarding the following:     Chief Complaint: Follow up for persistent afib     HPI:    Since last visit the patient's status has been stable. She only stayed in rhythm about 1 day following ECV, but she notes significant improvement in her symptoms. She does note this is the best she has felt in many months. No CP, palps, SOB, fatigue.     History:     Patient is a 68 year old female with a history of atrial fibrillation (diagnosed in 2016), COPD, BC s/p mastectomy, and hypothyroidism that presents today in consultation for afib. She has reportedly been on Rythmol and and BB in past and maintained rhythm for 2 years. Her Rythmol was discontinued in June and she believes that is when she went out of rhythm. She is symptomatic with EID and palpitations. She was started on Tikosyn, but was discontinued because \"her heart went crazy and was beating 220 bpm\". She was also intolerant to amiodarone in the past. She feels SOB on a daily basis. Denies any chest pain or LE edema.         Cardiac PMH: (Old records have been reviewed and summarized below)    • A-fib (CMS/[facility]) 11/01/2018       Note Last Updated: 11/1/2018       · DX 2016  · Chadsvsc: 4 (HTN, female, age, HF)  · Echocardiogram 5/18/2018 ([facility]): EF 60-65%, diastolic dysfunction, LA 4.8 cm, mild MR, mild AR, mild TR, RVSP 35 mmHg  · History of Rythmol use with recurrent A. Fib  · Reported Tikosyn initiation, 10 2018, [facility] [place]: Unclear of why discontinued      • Heart failure (CMS/[facility]) 11/01/2018       Note Last Updated: 11/1/2018       · HF with preserved EF, setting of A. Fib with RVR      • Hypertension                  Current Outpatient Medications:   •  anastrozole (ARIMIDEX) 1 MG tablet, Take 1 mg by mouth Daily., Disp: , Rfl:   •  apixaban (ELIQUIS) 5 MG tablet tablet, Take 5 mg by mouth 2 (Two) Times a Day., Disp: , " Rfl:   •  calcium carbonate (OS-KENROY) 600 MG tablet, Take 600 mg by mouth Daily., Disp: , Rfl:   •  citalopram (CeleXA) 20 MG tablet, Take  by mouth Daily., Disp: , Rfl:   •  diltiaZEM CD (CARDIZEM CD) 120 MG 24 hr capsule, Take 120 mg by mouth Daily., Disp: , Rfl:   •  Fluticasone Furoate-Vilanterol (BREO ELLIPTA) 100-25 MCG/INH aerosol powder , Inhale 1 puff Daily., Disp: , Rfl:   •  furosemide (LASIX) 20 MG tablet, Take 20 mg by mouth As Needed., Disp: , Rfl:   •  levothyroxine (SYNTHROID, LEVOTHROID) 50 MCG tablet, Take 50 mcg by mouth Daily., Disp: , Rfl:   •  metoprolol tartrate (LOPRESSOR) 50 MG tablet, Take 50 mg by mouth 2 (Two) Times a Day., Disp: , Rfl:   •  Multiple Vitamins-Minerals (MULTIVITAMIN WITH MINERALS) tablet tablet, Take 1 tablet by mouth Daily., Disp: , Rfl:   •  potassium chloride (K-DUR,KLOR-CON) 20 MEQ CR tablet, Take 20 mEq by mouth As Needed., Disp: , Rfl:     Past Medical History, Past Surgical History, Family history, Social History, and Medications were all reviewed with the patient today and updated as necessary.       Patient Active Problem List   Diagnosis   • A-fib (CMS/HCC)   • Heart failure (CMS/HCC)   • Hypertension   • RELL (obstructive sleep apnea)     Allergies   Allergen Reactions   • Amiodarone Arrhythmia     States got deathly ill. States had congestive heart failure.      Past Medical History:   Diagnosis Date   • A-fib (CMS/HCC)    • Anxiety    • Breast CA (CMS/HCC)    • COPD (chronic obstructive pulmonary disease) (CMS/HCC)    • Depressed    • Heart failure (CMS/HCC)    • Heart failure (CMS/HCC)    • Hypertension    • Hypothyroidism    • Obesity    • Thyroid disease      Past Surgical History:   Procedure Laterality Date   • BREAST SURGERY Right    • FOOT SURGERY Right    • HAND SURGERY Left      Family History   Problem Relation Age of Onset   • Stroke Mother    • Stroke Father    • Colon cancer Sister      Social History     Tobacco Use   • Smoking status: Former  "Smoker     Years: 20.00     Types: Cigarettes     Last attempt to quit: 2000     Years since quittin.0   • Smokeless tobacco: Never Used   Substance Use Topics   • Alcohol use: No         PHYSICAL EXAM:    /90 (BP Location: Left arm, Patient Position: Sitting)   Pulse 90   Ht 165.1 cm (65\")   Wt 95.9 kg (211 lb 6.4 oz)   BMI 35.18 kg/m²        Wt Readings from Last 5 Encounters:   19 95.9 kg (211 lb 6.4 oz)   18 95.6 kg (210 lb 12.2 oz)   18 96.6 kg (213 lb)   18 96.6 kg (212 lb 15.4 oz)   10/31/18 96.6 kg (213 lb)       BP Readings from Last 5 Encounters:   19 160/90   18 132/76   18 128/86   18 120/80   10/31/18 126/61       General-Well Nourished, Well developed  Eyes - PERRLA  Neck- supple, No mass  CV- irregular rhythm, normal rate. no MRG, No edema  Lung- clear bilaterally  Abd- soft, +BS  Musc/skel - Norm strength and range of motion  Skin- warm and dry  Neuro - Alert & Oriented x 3, appropriate mood.        Medical problems and test results were reviewed with the patient today.     No results found for this or any previous visit (from the past 672 hour(s)).      EKG: (EKG has been independently visualized by me and summarized below)      ECG 12 Lead  Date/Time: 2019 1:49 PM  Performed by: Nancy Reyes PA  Authorized by: Nancy Reyes PA   Rhythm: atrial fibrillation  Rate: normal  BPM: 90  Conduction: conduction normal  ST Segments: ST segments normal  T Waves: T waves normal  QRS axis: right  Other: no other findings  Clinical impression: abnormal ECG             ASSESSMENT and PLAN    1. Persistent, now more permanent AFib- s/p ECV 2018 after starting Rythmol with return to afib within 2 days. Rates previously were uncontrolled. Rythmol discontinued and now rate controlled afib w/ Dilt and Lopressor. Patient reports feeling significantly better despite being in afib. Has failed Tikosyn in the past due to [place] and unable to " tolerate amio. We are very limited with AADs. Will place 24 hour holter to ensure rates are controlled. If patient is symptomatically improved, will likely just pursue rate control. Continue Dilitiazem, Lopressor. Eliquis 5mg BID for anticoagulation.     2. HTN- controlled. Continue current meds     Return in about 3 months (around 4/29/2019).      Nancy Reyes PA-C  Cardiology/Electrophysiology  1/29/2019  1:52 PM

## 2019-05-28 ENCOUNTER — OFFICE VISIT (OUTPATIENT)
Dept: CARDIOLOGY | Facility: CLINIC | Age: 69
End: 2019-05-28

## 2019-05-28 VITALS
SYSTOLIC BLOOD PRESSURE: 140 MMHG | OXYGEN SATURATION: 96 % | DIASTOLIC BLOOD PRESSURE: 86 MMHG | HEIGHT: 64 IN | WEIGHT: 214 LBS | BODY MASS INDEX: 36.54 KG/M2 | HEART RATE: 78 BPM

## 2019-05-28 DIAGNOSIS — I10 ESSENTIAL HYPERTENSION: Primary | ICD-10-CM

## 2019-05-28 DIAGNOSIS — I48.21 PERMANENT ATRIAL FIBRILLATION (HCC): ICD-10-CM

## 2019-05-28 PROCEDURE — 99213 OFFICE O/P EST LOW 20 MIN: CPT | Performed by: PHYSICIAN ASSISTANT

## 2019-05-28 PROCEDURE — 93000 ELECTROCARDIOGRAM COMPLETE: CPT | Performed by: PHYSICIAN ASSISTANT

## 2019-05-28 RX ORDER — DILTIAZEM HYDROCHLORIDE 120 MG/1
120 CAPSULE, COATED, EXTENDED RELEASE ORAL DAILY
Qty: 30 CAPSULE | Refills: 6 | Status: SHIPPED | OUTPATIENT
Start: 2019-05-28

## 2019-05-28 RX ORDER — METOPROLOL TARTRATE 50 MG/1
50 TABLET, FILM COATED ORAL 2 TIMES DAILY
Qty: 60 TABLET | Refills: 6 | Status: SHIPPED | OUTPATIENT
Start: 2019-05-28

## 2019-05-28 NOTE — PROGRESS NOTES
"    Renetta Reilly  1950  PCP: Tee Salmeron DO    SUBJECTIVE:   Renetta Reilly is a 69 y.o. female seen for a follow up visit regarding the following:     Chief Complaint: Follow up for persistent afib     HPI:    Since last visit the patient's status has been stable. Her rates have remained controlled. She denies any cp, sob, edema, palps, syncope. No bleeding on Eliquis.     History:      Patient is a 68 year old female with a history of atrial fibrillation (diagnosed in 2016), COPD, BC s/p mastectomy, and hypothyroidism that presents today in consultation for afib. She has reportedly been on Rythmol and and BB in past and maintained rhythm for 2 years. Her Rythmol was discontinued in June and she believes that is when she went out of rhythm. She is symptomatic with EID and palpitations. She was started on Tikosyn, but was discontinued because \"her heart went crazy and was beating 220 bpm\". She was also intolerant to amiodarone in the past. She feels SOB on a daily basis. Denies any chest pain or LE edema.            Cardiac PMH: (Old records have been reviewed and summarized below)           • A-fib (CMS/[facility]) 11/01/2018       Note Last Updated: 11/1/2018       · DX 2016  · Chadsvsc: 4 (HTN, female, age, HF)  · Echocardiogram 5/18/2018 ([facility]): EF 60-65%, diastolic dysfunction, LA 4.8 cm, mild MR, mild AR, mild TR, RVSP 35 mmHg  · History of Rythmol use with recurrent A. Fib  · Reported Tikosyn initiation, 10 2018, [facility] [place]: Unclear of why discontinued      • Heart failure (CMS/[facility]) 11/01/2018       Note Last Updated: 11/1/2018       · HF with preserved EF, setting of A. Fib with RVR      • Hypertension              Current Outpatient Medications:   •  anastrozole (ARIMIDEX) 1 MG tablet, Take 1 mg by mouth Daily., Disp: , Rfl:   •  apixaban (ELIQUIS) 5 MG tablet tablet, Take 1 tablet by mouth Every 12 (Twelve) Hours., Disp: 60 tablet, Rfl: 6  •  calcium carbonate (OS-KENROY) 600 MG tablet, Take 600 mg " by mouth Daily., Disp: , Rfl:   •  citalopram (CeleXA) 20 MG tablet, Take  by mouth Daily., Disp: , Rfl:   •  diltiaZEM CD (CARDIZEM CD) 120 MG 24 hr capsule, Take 1 capsule by mouth Daily., Disp: 30 capsule, Rfl: 6  •  Fluticasone Furoate-Vilanterol (BREO ELLIPTA) 100-25 MCG/INH aerosol powder , Inhale 1 puff Daily., Disp: , Rfl:   •  furosemide (LASIX) 20 MG tablet, Take 20 mg by mouth As Needed., Disp: , Rfl:   •  levothyroxine (SYNTHROID, LEVOTHROID) 50 MCG tablet, Take 50 mcg by mouth Daily., Disp: , Rfl:   •  metoprolol tartrate (LOPRESSOR) 50 MG tablet, Take 1 tablet by mouth 2 (Two) Times a Day., Disp: 60 tablet, Rfl: 6  •  Multiple Vitamins-Minerals (MULTIVITAMIN WITH MINERALS) tablet tablet, Take 1 tablet by mouth Daily., Disp: , Rfl:   •  potassium chloride (K-DUR,KLOR-CON) 20 MEQ CR tablet, Take 20 mEq by mouth As Needed., Disp: , Rfl:     Past Medical History, Past Surgical History, Family history, Social History, and Medications were all reviewed with the patient today and updated as necessary.       Patient Active Problem List   Diagnosis   • A-fib (CMS/HCC)   • Heart failure (CMS/HCC)   • Hypertension   • RELL (obstructive sleep apnea)     Allergies   Allergen Reactions   • Amiodarone Arrhythmia     States got deathly ill. States had congestive heart failure.      Past Medical History:   Diagnosis Date   • A-fib (CMS/HCC)    • Anxiety    • Breast CA (CMS/HCC)    • COPD (chronic obstructive pulmonary disease) (CMS/HCC)    • Depressed    • Heart failure (CMS/HCC)    • Heart failure (CMS/HCC)    • Hypertension    • Hypothyroidism    • Obesity    • Thyroid disease      Past Surgical History:   Procedure Laterality Date   • BREAST SURGERY Right    • FOOT SURGERY Right    • HAND SURGERY Left      Family History   Problem Relation Age of Onset   • Stroke Mother    • Stroke Father    • Colon cancer Sister      Social History     Tobacco Use   • Smoking status: Former Smoker     Years: 20.00     Types:  "Cigarettes     Last attempt to quit: 2000     Years since quittin.4   • Smokeless tobacco: Never Used   Substance Use Topics   • Alcohol use: No         PHYSICAL EXAM:    /86 (BP Location: Left arm, Patient Position: Sitting)   Pulse 78   Ht 162.6 cm (64\")   Wt 97.1 kg (214 lb)   SpO2 96%   BMI 36.73 kg/m²        Wt Readings from Last 5 Encounters:   19 97.1 kg (214 lb)   19 95.9 kg (211 lb 6.4 oz)   18 95.6 kg (210 lb 12.2 oz)   18 96.6 kg (213 lb)   18 96.6 kg (212 lb 15.4 oz)       BP Readings from Last 5 Encounters:   19 140/86   19 160/90   18 132/76   18 128/86   18 120/80       General-Well Nourished, Well developed  Eyes - PERRLA  Neck- supple, No mass  CV- irregular rate and rhythm, no MRG, No edema  Lung- clear bilaterally  Abd- soft, +BS  Musc/skel - Norm strength and range of motion  Skin- warm and dry  Neuro - Alert & Oriented x 3, appropriate mood.        Medical problems and test results were reviewed with the patient today.     No results found for this or any previous visit (from the past 672 hour(s)).      EKG: (EKG has been independently visualized by me and summarized below)      ECG 12 Lead  Date/Time: 2019 3:25 PM  Performed by: Nancy Reyes PA  Authorized by: Nancy Reyes PA   Comparison: compared with previous ECG from 2019  Similar to previous ECG  Rhythm: atrial fibrillation  Rate: normal  BPM: 85    Clinical impression: abnormal EKG             ASSESSMENT and PLAN    1. Persistent, now more permanent afib-she has failed Tikosyn and Rythmol. Rates are controlled on Diltiazem and Lopressor- avg 77 bpm on Holter. Will continue with rate control. Continue Eliqius 5mg BID.     2. HTN- controlled. Continue current meds.       Return in about 6 months (around 2019).        LOULOU SaenzC   Cardiology/Electrophysiology  2019  3:26 PM  "

## 2019-08-13 ENCOUNTER — HOSPITAL ENCOUNTER (OUTPATIENT)
Dept: GENERAL RADIOLOGY | Facility: HOSPITAL | Age: 69
Discharge: HOME | End: 2019-08-13
Admitting: INTERNAL MEDICINE

## 2019-08-13 ENCOUNTER — TRANSCRIBE ORDERS (OUTPATIENT)
Dept: ADMINISTRATIVE | Facility: HOSPITAL | Age: 69
End: 2019-08-13

## 2019-08-13 DIAGNOSIS — J20.9 ACUTE BRONCHITIS, UNSPECIFIED ORGANISM: Primary | ICD-10-CM

## 2019-08-13 PROCEDURE — 71046 X-RAY EXAM CHEST 2 VIEWS: CPT

## 2019-09-10 ENCOUNTER — HOSPITAL ENCOUNTER (OUTPATIENT)
Dept: GENERAL RADIOLOGY | Facility: HOSPITAL | Age: 69
Discharge: HOME | End: 2019-09-10
Admitting: INTERNAL MEDICINE

## 2019-09-10 ENCOUNTER — TRANSCRIBE ORDERS (OUTPATIENT)
Dept: ADMINISTRATIVE | Facility: HOSPITAL | Age: 69
End: 2019-09-10

## 2019-09-10 DIAGNOSIS — R93.89 ABNORMAL CXR (CHEST X-RAY): Primary | ICD-10-CM

## 2019-09-10 PROCEDURE — 71046 X-RAY EXAM CHEST 2 VIEWS: CPT

## 2019-09-12 ENCOUNTER — TRANSCRIBE ORDERS (OUTPATIENT)
Dept: ADMINISTRATIVE | Facility: HOSPITAL | Age: 69
End: 2019-09-12

## 2019-09-12 DIAGNOSIS — R93.89 ABNORMAL CHEST X-RAY: Primary | ICD-10-CM

## 2019-09-16 ENCOUNTER — HOSPITAL ENCOUNTER (OUTPATIENT)
Dept: CT IMAGING | Facility: HOSPITAL | Age: 69
Discharge: HOME | End: 2019-09-16
Admitting: INTERNAL MEDICINE

## 2019-09-16 DIAGNOSIS — R93.89 ABNORMAL CHEST X-RAY: ICD-10-CM

## 2019-09-16 PROCEDURE — 71250 CT THORAX DX C-: CPT

## 2019-09-17 ENCOUNTER — TRANSCRIBE ORDERS (OUTPATIENT)
Dept: ADMINISTRATIVE | Facility: HOSPITAL | Age: 69
End: 2019-09-17

## 2019-09-17 DIAGNOSIS — R91.1 COIN LESION: Primary | ICD-10-CM

## 2019-09-17 DIAGNOSIS — R91.1 NODULE OF LEFT LUNG: ICD-10-CM

## 2019-09-23 NOTE — NURSING NOTE
Reviewed this case with Dr. Benitez and this is not amenable to percutaneous biopsy. She recommends that this patient have a PET/CT scan for follow up. [facility] staff notified.

## 2019-09-24 ENCOUNTER — APPOINTMENT (OUTPATIENT)
Dept: CT IMAGING | Facility: HOSPITAL | Age: 69
End: 2019-09-24

## 2019-09-24 NOTE — NURSING NOTE
Spoke with Dr Fish, communicated that this biopsy would not be possible tomorrow. Passed along the recommendation of PET/CT scan, the staff at Dr Fish' office will call patient and work to schedule a PET scan.

## 2019-09-25 ENCOUNTER — APPOINTMENT (OUTPATIENT)
Dept: CT IMAGING | Facility: HOSPITAL | Age: 69
End: 2019-09-25

## 2019-09-25 ENCOUNTER — HOSPITAL ENCOUNTER (OUTPATIENT)
Dept: CT IMAGING | Facility: HOSPITAL | Age: 69
Discharge: HOME | End: 2019-09-25

## 2019-12-31 ENCOUNTER — HOSPITAL ENCOUNTER (OUTPATIENT)
Dept: GENERAL RADIOLOGY | Facility: HOSPITAL | Age: 69
Discharge: HOME | End: 2019-12-31
Admitting: INTERNAL MEDICINE

## 2019-12-31 ENCOUNTER — TRANSCRIBE ORDERS (OUTPATIENT)
Dept: ADMINISTRATIVE | Facility: HOSPITAL | Age: 69
End: 2019-12-31

## 2019-12-31 DIAGNOSIS — R07.89 CHEST WALL PAIN: Primary | ICD-10-CM

## 2019-12-31 PROCEDURE — 71101 X-RAY EXAM UNILAT RIBS/CHEST: CPT

## 2019-12-31 PROCEDURE — 71046 X-RAY EXAM CHEST 2 VIEWS: CPT

## 2021-02-09 ENCOUNTER — TRANSCRIBE ORDERS (OUTPATIENT)
Dept: ADMINISTRATIVE | Facility: HOSPITAL | Age: 71
End: 2021-02-09

## 2021-02-09 ENCOUNTER — HOSPITAL ENCOUNTER (OUTPATIENT)
Dept: GENERAL RADIOLOGY | Facility: HOSPITAL | Age: 71
Discharge: HOME | End: 2021-02-09
Admitting: INTERNAL MEDICINE

## 2021-02-09 DIAGNOSIS — J44.1 COPD EXACERBATION (HCC): Primary | ICD-10-CM

## 2021-02-09 PROCEDURE — 71046 X-RAY EXAM CHEST 2 VIEWS: CPT
